# Patient Record
Sex: MALE
[De-identification: names, ages, dates, MRNs, and addresses within clinical notes are randomized per-mention and may not be internally consistent; named-entity substitution may affect disease eponyms.]

---

## 2018-12-12 ENCOUNTER — HOSPITAL ENCOUNTER (EMERGENCY)
Dept: HOSPITAL 59 - ER | Age: 48
Discharge: HOME | End: 2018-12-12
Payer: COMMERCIAL
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
## 2018-12-12 DIAGNOSIS — T20.12XA: ICD-10-CM
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
## 2018-12-12 DIAGNOSIS — T20.24XA: Primary | ICD-10-CM
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
## 2018-12-12 DIAGNOSIS — T20.112A: ICD-10-CM

## 2018-12-12 PROCEDURE — 99283 EMERGENCY DEPT VISIT LOW MDM: CPT

## 2018-12-12 PROCEDURE — 96372 THER/PROPH/DIAG INJ SC/IM: CPT

## 2018-12-12 PROCEDURE — 90715 TDAP VACCINE 7 YRS/> IM: CPT
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
